# Patient Record
Sex: FEMALE | Race: WHITE
[De-identification: names, ages, dates, MRNs, and addresses within clinical notes are randomized per-mention and may not be internally consistent; named-entity substitution may affect disease eponyms.]

---

## 2018-06-25 ENCOUNTER — HOSPITAL ENCOUNTER (OUTPATIENT)
Dept: HOSPITAL 33 - SDC | Age: 73
Discharge: HOME | End: 2018-06-25
Attending: FAMILY MEDICINE
Payer: MEDICARE

## 2018-06-25 VITALS — DIASTOLIC BLOOD PRESSURE: 85 MMHG | SYSTOLIC BLOOD PRESSURE: 155 MMHG

## 2018-06-25 VITALS — OXYGEN SATURATION: 97 % | HEART RATE: 70 BPM

## 2018-06-25 DIAGNOSIS — Z12.11: Primary | ICD-10-CM

## 2018-06-25 PROCEDURE — 99100 ANES PT EXTEME AGE<1 YR&>70: CPT

## 2018-06-25 NOTE — OP
SURGERY DATE/TIME:  06/25/2018  0658    



PREOPERATIVE DIAGNOSIS:      Screening exam.



POSTOPERATIVE DIAGNOSIS:      Normal colon. 



PROCEDURE:    Colonoscopy.



SURGEON:    Dr. Fuentes.



ANESTHESIA:    MAC. Medications given by anesthesia department. 



HISTORY: The patient is a 73 year-old white female presenting now for colonoscopic 
evaluation. She was appraised of the risks of the procedure including the risk of 
perforation, phlebitis, untoward reaction to medication, bleeding and missed lesions.  The 
patient verbalized her understanding and desired to have the procedure performed.



DESCRIPTION OF PROCEDURE: The patient was given the medications by the anesthesia 
department. She had continuous pulse oximetry, ECG monitoring, intermittent blood pressure 
monitoring and tidal CO2 monitoring during the examination. She was placed in the left 
lateral decubitus position. A digital rectal examination was performed and revealed normal 
anal sphincter tone and no masses.  The flexible Olympus pediatric colonoscope was used to 
intubate the rectum. A view of the colon was developed sequentially to the cecum. Upon 
insertion and withdrawal, including a retroflex view in the rectum, no mucosal lesions 
were encountered.  The scope was removed from the patient who tolerated the procedure well 
and was sent back to OP recovery in good condition. The prep was noted to be fair.

## 2023-03-17 ENCOUNTER — HOSPITAL ENCOUNTER (EMERGENCY)
Dept: HOSPITAL 33 - ED | Age: 78
LOS: 1 days | Discharge: HOME | End: 2023-03-18
Payer: MEDICARE

## 2023-03-17 DIAGNOSIS — I16.0: Primary | ICD-10-CM

## 2023-03-17 DIAGNOSIS — Z79.899: ICD-10-CM

## 2023-03-17 DIAGNOSIS — I10: ICD-10-CM

## 2023-03-17 LAB
BASOPHILS # BLD AUTO: 0.05 X10^3/UL (ref 0–0.4)
BASOPHILS NFR BLD AUTO: 1.1 % (ref 0–0.4)
EOSINOPHIL # BLD AUTO: 0.15 X10^3/UL (ref 0–0.5)
HCT VFR BLD AUTO: 37.4 % (ref 35–47)
HGB BLD-MCNC: 11.8 G/DL (ref 12–16)
IMM GRANULOCYTES # BLD: 0.02 X10^3U/L (ref 0–0.03)
IMM GRANULOCYTES NFR BLD: 0.4 % (ref 0–0.4)
LYMPHOCYTES # SPEC AUTO: 1.31 X10^3/UL (ref 1–4.6)
MCH RBC QN AUTO: 27.2 PG (ref 26–32)
MCHC RBC AUTO-ENTMCNC: 31.6 G/DL (ref 32–36)
MONOCYTES # BLD AUTO: 0.52 X10^3/UL (ref 0–1.3)
NRBC # BLD AUTO: 0 X10^3U/L (ref 0–0.01)
NRBC BLD AUTO-RTO: 0 % (ref 0–0.1)
PLATELET # BLD AUTO: 221 X10^3/UL (ref 150–450)
RBC # BLD AUTO: 4.34 X10^6/UL (ref 4.1–5.4)
WBC # BLD AUTO: 4.7 X10^3/UL (ref 4–10.5)

## 2023-03-17 PROCEDURE — 36415 COLL VENOUS BLD VENIPUNCTURE: CPT

## 2023-03-17 PROCEDURE — 96374 THER/PROPH/DIAG INJ IV PUSH: CPT

## 2023-03-17 PROCEDURE — 84484 ASSAY OF TROPONIN QUANT: CPT

## 2023-03-17 PROCEDURE — 84443 ASSAY THYROID STIM HORMONE: CPT

## 2023-03-17 PROCEDURE — 85025 COMPLETE CBC W/AUTO DIFF WBC: CPT

## 2023-03-17 PROCEDURE — 93041 RHYTHM ECG TRACING: CPT

## 2023-03-17 PROCEDURE — 93005 ELECTROCARDIOGRAM TRACING: CPT

## 2023-03-17 PROCEDURE — 83605 ASSAY OF LACTIC ACID: CPT

## 2023-03-17 PROCEDURE — 83880 ASSAY OF NATRIURETIC PEPTIDE: CPT

## 2023-03-17 PROCEDURE — 99284 EMERGENCY DEPT VISIT MOD MDM: CPT

## 2023-03-17 PROCEDURE — 36000 PLACE NEEDLE IN VEIN: CPT

## 2023-03-17 PROCEDURE — 83735 ASSAY OF MAGNESIUM: CPT

## 2023-03-17 PROCEDURE — 80053 COMPREHEN METABOLIC PANEL: CPT

## 2023-03-17 NOTE — ERPHSYRPT
- History of Present Illness


Time Seen by Provider: 03/17/23 22:58


Source: patient


Exam Limitations: no limitations


Physician History: 


Patient presents w/ worsening BP over the past week


BP 190s/100s at home


She reports HA and nausea, but denies CP, palpitations, SOB or vomiting. 


She is compliant w/ BP meds


No recent med changes


Recently took abx for tooth infection


Timing/Duration: day(s) (3), gradual onset


Activities at Onset: none


Quality: other (na)


Location: other (na)


Severity of Pain-Max: none


Severity of Pain-Current: none


Modifying Factors: Improves With: other (na)


Nitro Today/Relief: no nitro taken today


Aspirin Treatment Today: no aspirin today


Associated Symptoms: nausea, headaches, No vomiting, No abdominal pain, No 

shortness of breath, No heartburn, No diaphoresis, No cough, No chills, No chest

pain, No fever


Prior Chest Pain/Cardiac Workup: no prior chest pain


Allergies/Adverse Reactions: 








Sulfa (Sulfonamide Antibiotics) Allergy (Intermediate, Verified 03/17/23 23:10)


   Diarrhea





Home Medications: 








Amoxicillin 500 mg PO Q6H 03/17/23 [History]


Famotidine 20 mg*** [Pepcid 20 MG***] 20 mg PO BID 03/17/23 [History]


Lisinopril 10 mg*** [Zestril 10 MG***] 10 mg PO DAILY 03/17/23 [History]


Metoprolol Succinate 25 mg PO BID 03/17/23 [History]


Omeprazole 40 mg PO DAILY 03/17/23 [History]


Rosuvastatin Calcium 20 mg PO HS 03/17/23 [History]








- Review of Systems


Constitutional: No Symptoms


Eyes: No Symptoms


Ears, Nose, & Throat: No Symptoms


Respiratory: No Symptoms


Cardiac: No Symptoms


Abdominal/Gastrointestinal: Nausea, No Abdominal Pain, No Vomiting, No Diarrhea,

 No Constipation, No Hematemesis, No Hematochezia, No Melena, No Appetite 

Changes


Genitourinary Symptoms: No Symptoms


Musculoskeletal: No Symptoms


Skin: No Symptoms


Neurological: No Symptoms


Psychological: No Symptoms


Endocrine: No Symptoms


Hematologic/Lymphatic: No Symptoms


Immunological/Allergic: No Symptoms





- Past Medical History


Pertinent Past Medical History: Yes


Neurological History: No Pertinent History


ENT History: No Pertinent History


Cardiac History: No Pertinent History


Respiratory History: No Pertinent History


Endocrine Medical History: No Pertinent History


Musculoskeletal History: Fractures


GI Medical History: GERD, Polyps


 History: No Pertinent History


Psycho-Social History: No Pertinent History


Female Reproductive Disorders: No Pertinent History


Other Medical History: REFLUX, VITAMIN D DEFICIENCY





- Past Surgical History


Past Surgical History: Yes


Neuro Surgical History: No Pertinent History


Cardiac: No Pertinent History


Respiratory: No Pertinent History


Gastrointestinal: No Pertinent History


Genitourinary: No Pertinent History


Musculoskeletal: Orthopedic Surgery


Female Surgical History: Hysterectomy


Other Surgical History: left knee scope





- Social History


Smoking Status: Never smoker


Exposure to second hand smoke: No


Drug Use: none





- Nursing Vital Signs


Nursing Vital Signs: 


                               Initial Vital Signs











Temperature  97.2 F   03/17/23 22:51


 


Pulse Rate  68   03/17/23 22:51


 


Respiratory Rate  18   03/17/23 22:51


 


Blood Pressure  175/79   03/17/23 22:51


 


O2 Sat by Pulse Oximetry  98   03/17/23 22:51








                                   Pain Scale











Pain Intensity                 0

















- Physical Exam


General Appearance: no apparent distress


Eye Exam: PERRL/EOMI, eyes nml inspection


Ears, Nose, Throat Exam: normal ENT inspection


Neck Exam: normal inspection, non-tender, supple, full range of motion


Respiratory Exam: normal breath sounds, lungs clear, airway intact, No chest 

tenderness, No respiratory distress


Cardiovascular Exam: regular rate/rhythm, normal heart sounds, capillary refill 

<2 sec, No edema


Gastrointestinal/Abdomen Exam: soft, normal bowel sounds, No tenderness


Extremity Exam: normal inspection, normal range of motion, No swelling, No 

tenderness


Neurologic Exam: alert, oriented x 3, cooperative, CNs II-XII nml as tested, 

normal mood/affect, nml cerebellar function, nml station & gait, sensation nml, 

No slurred speech, No aphasia, No dysarthria


Skin Exam: normal color, warm, dry


**SpO2 Interpretation**: normal


O2 Delivery: Room Air





- Course


Nursing assessment & vital signs reviewed: Yes


EKG Interpreted by Me: RATE ([id number]), Sinus Rhythm, NORMAL AXIS, NORMAL INTERVALS, 

NORMAL ST-T


Ordered Tests: 


Medication Summary














Discontinued Medications














Generic Name Dose Route Start Last Admin





  Trade Name Freq  PRN Reason Stop Dose Admin


 


Hydralazine HCl  10 mg  03/17/23 23:14  03/17/23 23:54





  Hydralazine Hcl 20 Mg/Ml Vial  IV  03/17/23 23:15  10 mg





  STAT ONE   Administration


 


Hydralazine HCl  Confirm  03/17/23 23:51 





  Hydralazine Hcl 20 Mg/Ml Vial  Administered  03/17/23 23:52 





  Dose  





  20 mg  





  .ROUTE  





  .STK-MED ONE  











Lab/Rad Data: 


                           Laboratory Result Diagrams





                                 03/17/23 23:20 





                                 03/17/23 23:20 





                               Laboratory Results











  03/17/23 03/17/23 03/17/23 Range/Units





  23:20 23:20 23:20 


 


WBC    4.7  (4.0-10.5)  x10^3/uL


 


RBC    4.34  (4.1-5.4)  x10^6/uL


 


Hgb    11.8 L  (12.0-16.0)  g/dL


 


Hct    37.4  (35-47)  %


 


MCV    86.2  ()  fL


 


MCH    27.2  (26-32)  pg


 


MCHC    31.6 L  (32-36)  g/dL


 


RDW    13.9  (11.5-14.0)  %


 


Plt Count    221  (150-450)  x10^3/uL


 


MPV    10.5  (7.5-11.0)  fL


 


Gran %    56.7  (36.0-66.0)  %


 


Immature Gran % (Auto)    0.4  (0.00-0.4)  %


 


Nucleat RBC Rel Count    0.0  (0.00-0.1)  %


 


Eos # (Auto)    0.15  (0-0.5)  x10^3/uL


 


Immature Gran # (Auto)    0.02  (0.00-0.03)  x10^3u/L


 


Absolute Lymphs (auto)    1.31  (1.0-4.6)  x10^3/uL


 


Absolute Monos (auto)    0.52  (0.0-1.3)  x10^3/uL


 


Absolute Nucleated RBC    0.00  (0.00-0.01)  x10^3u/L


 


Lymphocytes %    27.6  (24.0-44.0)  %


 


Monocytes %    11.0  (0.0-12.0)  %


 


Eosinophils %    3.2  (0.00-5.0)  %


 


Basophils %    1.1  (0.0-0.4)  %


 


Absolute Granulocytes    2.69  (1.4-6.9)  x10^3/uL


 


Basophils #    0.05  (0-0.4)  x10^3/uL


 


Sodium   140   (137-145)  mmol/L


 


Potassium   3.6   (3.5-5.1)  mmol/L


 


Chloride   108 H   ()  mmol/L


 


Carbon Dioxide   27   (22-30)  mmol/L


 


Anion Gap   8.5   (5-15)  MEQ/L


 


BUN   15   (7-17)  mg/dL


 


Creatinine   0.61   (0.52-1.04)  mg/dL


 


Estimated GFR   > 60.0   ML/MIN


 


Glucose   117 H   ()  mg/dL


 


Lactic Acid     (0.4-2.0)  


 


Calcium   8.8   (8.4-10.2)  mg/dL


 


Magnesium   2.1   (1.6-2.3)  mg/dL


 


Total Bilirubin   0.40   (0.2-1.3)  mg/dL


 


AST   27   (14-36)  U/L


 


ALT   24   (0-35)  U/L


 


Alkaline Phosphatase   109   ()  U/L


 


Troponin I  < 0.012    (0.000-0.034)  ng/mL


 


NT-Pro-B Natriuret Pep   280   (<300)  pg/mL


 


Serum Total Protein   6.8   (6.3-8.2)  g/dL


 


Albumin   3.8   (3.5-5.0)  g/dL


 


TSH 3rd Generation   4.140   (0.47-4.68)  mIU/L














  03/17/23 Range/Units





  23:19 


 


WBC   (4.0-10.5)  x10^3/uL


 


RBC   (4.1-5.4)  x10^6/uL


 


Hgb   (12.0-16.0)  g/dL


 


Hct   (35-47)  %


 


MCV   ()  fL


 


MCH   (26-32)  pg


 


MCHC   (32-36)  g/dL


 


RDW   (11.5-14.0)  %


 


Plt Count   (150-450)  x10^3/uL


 


MPV   (7.5-11.0)  fL


 


Gran %   (36.0-66.0)  %


 


Immature Gran % (Auto)   (0.00-0.4)  %


 


Nucleat RBC Rel Count   (0.00-0.1)  %


 


Eos # (Auto)   (0-0.5)  x10^3/uL


 


Immature Gran # (Auto)   (0.00-0.03)  x10^3u/L


 


Absolute Lymphs (auto)   (1.0-4.6)  x10^3/uL


 


Absolute Monos (auto)   (0.0-1.3)  x10^3/uL


 


Absolute Nucleated RBC   (0.00-0.01)  x10^3u/L


 


Lymphocytes %   (24.0-44.0)  %


 


Monocytes %   (0.0-12.0)  %


 


Eosinophils %   (0.00-5.0)  %


 


Basophils %   (0.0-0.4)  %


 


Absolute Granulocytes   (1.4-6.9)  x10^3/uL


 


Basophils #   (0-0.4)  x10^3/uL


 


Sodium   (137-145)  mmol/L


 


Potassium   (3.5-5.1)  mmol/L


 


Chloride   ()  mmol/L


 


Carbon Dioxide   (22-30)  mmol/L


 


Anion Gap   (5-15)  MEQ/L


 


BUN   (7-17)  mg/dL


 


Creatinine   (0.52-1.04)  mg/dL


 


Estimated GFR   ML/MIN


 


Glucose   ()  mg/dL


 


Lactic Acid  0.7  (0.4-2.0)  


 


Calcium   (8.4-10.2)  mg/dL


 


Magnesium   (1.6-2.3)  mg/dL


 


Total Bilirubin   (0.2-1.3)  mg/dL


 


AST   (14-36)  U/L


 


ALT   (0-35)  U/L


 


Alkaline Phosphatase   ()  U/L


 


Troponin I   (0.000-0.034)  ng/mL


 


NT-Pro-B Natriuret Pep   (<300)  pg/mL


 


Serum Total Protein   (6.3-8.2)  g/dL


 


Albumin   (3.5-5.0)  g/dL


 


TSH 3rd Generation   (0.47-4.68)  mIU/L














- Progress


Progress: improved


Air Movement: good


Progress Note: 


Patient given Hydralazine and BP responded well. Patient denied HA or nausea 

after BP improved. CP w/u neg for cardiac etiology. Hb was found to be 11.8. I 

increased her Lisinopril to 20mg BID from 10mg BID. I encouraged her to keep a 

BP log and f/u w/ her PCP for further management. 


Counseled pt/family regarding: lab results, diagnosis, need for follow-up





Medical Desision Making





- Diagnostic Testing


Diagnostic test were ordered, analyzed, and reviewed by me: Yes





- Risk of complications


The pt has a mod risk of morbidity or mortality based on: Need for prescription 

drug management





- Departure


Departure Disposition: Home


Clinical Impression: 


 Hypertensive urgency





Condition: Good


Critical Care Time: No


Referrals: 


LESLIE MIKE NP [Primary Care Provider] - Follow up/PCP as directed


Instructions:  Malignant Hypertension (DC)


Additional Instructions: 


Take 2 10mg tablets of Lisinopril twice a day until your current script runs 

out, then start the 20mg tablet twice a day after. Please keep a log of your BP 

for your cardiologist. 


Prescriptions: 


Lisinopril 20 mg*** [Zestril 20 MG***] 20 mg PO BID #30 tablet

## 2023-03-18 VITALS — DIASTOLIC BLOOD PRESSURE: 94 MMHG | SYSTOLIC BLOOD PRESSURE: 148 MMHG

## 2023-03-18 VITALS — OXYGEN SATURATION: 98 % | HEART RATE: 78 BPM

## 2023-03-18 LAB
ALBUMIN SERPL-MCNC: 3.8 G/DL (ref 3.5–5)
ALP SERPL-CCNC: 109 U/L (ref 38–126)
ALT SERPL-CCNC: 24 U/L (ref 0–35)
ANION GAP SERPL CALC-SCNC: 8.5 MEQ/L (ref 5–15)
AST SERPL QL: 27 U/L (ref 14–36)
BILIRUB BLD-MCNC: 0.4 MG/DL (ref 0.2–1.3)
BUN SERPL-MCNC: 15 MG/DL (ref 7–17)
CALCIUM SPEC-MCNC: 8.8 MG/DL (ref 8.4–10.2)
CHLORIDE SERPL-SCNC: 108 MMOL/L (ref 98–107)
CO2 SERPL-SCNC: 27 MMOL/L (ref 22–30)
CREAT SERPL-MCNC: 0.61 MG/DL (ref 0.52–1.04)
GFR SERPLBLD BASED ON 1.73 SQ M-ARVRAT: > 60 ML/MIN
GLUCOSE SERPL-MCNC: 117 MG/DL (ref 74–106)
MAGNESIUM SERPL-MCNC: 2.1 MG/DL (ref 1.6–2.3)
NT-PROBNP SERPL-MCNC: 280 PG/ML (ref ?–300)
POTASSIUM SERPLBLD-SCNC: 3.6 MMOL/L (ref 3.5–5.1)
PROT SERPL-MCNC: 6.8 G/DL (ref 6.3–8.2)
SODIUM SERPL-SCNC: 140 MMOL/L (ref 137–145)